# Patient Record
Sex: FEMALE | Race: WHITE | NOT HISPANIC OR LATINO | Employment: STUDENT | URBAN - METROPOLITAN AREA
[De-identification: names, ages, dates, MRNs, and addresses within clinical notes are randomized per-mention and may not be internally consistent; named-entity substitution may affect disease eponyms.]

---

## 2019-07-31 ENCOUNTER — HOSPITAL ENCOUNTER (OUTPATIENT)
Dept: LAB | Facility: CLINIC | Age: 19
Discharge: HOME | End: 2019-07-31
Attending: PEDIATRICS | Admitting: PEDIATRICS
Payer: COMMERCIAL

## 2019-07-31 DIAGNOSIS — Z11.1 TUBERCULOSIS SCREENING: Primary | ICD-10-CM

## 2019-07-31 DIAGNOSIS — Z11.1 SCREENING FOR TUBERCULOSIS: Primary | ICD-10-CM

## 2019-07-31 PROCEDURE — 36415 COLL VENOUS BLD VENIPUNCTURE: CPT | Performed by: PEDIATRICS

## 2019-07-31 PROCEDURE — 86481 TB AG RESPONSE T-CELL SUSP: CPT | Performed by: PEDIATRICS

## 2019-08-02 LAB
GAMMA INTERFERON BACKGROUND BLD IA-ACNC: 0.21 IU/ML
M TB IFN-G BLD-IMP: NEGATIVE
M TB IFN-G CD4+ BCKGRND COR BLD-ACNC: >10 IU/ML
MITOGEN IGNF BCKGRD COR BLD-ACNC: 0 IU/ML
MITOGEN IGNF BCKGRD COR BLD-ACNC: 0 IU/ML

## 2022-12-12 ENCOUNTER — LAB REQUISITION (OUTPATIENT)
Dept: LAB | Facility: CLINIC | Age: 22
End: 2022-12-12
Payer: COMMERCIAL

## 2022-12-12 DIAGNOSIS — D50.9 IRON DEFICIENCY ANEMIA, UNSPECIFIED: ICD-10-CM

## 2022-12-12 DIAGNOSIS — Z01.419 ENCOUNTER FOR GYNECOLOGICAL EXAMINATION (GENERAL) (ROUTINE) WITHOUT ABNORMAL FINDINGS: ICD-10-CM

## 2022-12-12 LAB
BASOPHILS # BLD AUTO: 0.1 10E3/UL (ref 0–0.2)
BASOPHILS NFR BLD AUTO: 1 %
EOSINOPHIL # BLD AUTO: 0.2 10E3/UL (ref 0–0.7)
EOSINOPHIL NFR BLD AUTO: 3 %
ERYTHROCYTE [DISTWIDTH] IN BLOOD BY AUTOMATED COUNT: 12.3 % (ref 10–15)
FERRITIN SERPL-MCNC: 36 NG/ML (ref 6–175)
HCT VFR BLD AUTO: 39.2 % (ref 35–47)
HGB BLD-MCNC: 13.4 G/DL (ref 11.7–15.7)
IMM GRANULOCYTES # BLD: 0 10E3/UL
IMM GRANULOCYTES NFR BLD: 1 %
LYMPHOCYTES # BLD AUTO: 2.5 10E3/UL (ref 0.8–5.3)
LYMPHOCYTES NFR BLD AUTO: 39 %
MCH RBC QN AUTO: 28.7 PG (ref 26.5–33)
MCHC RBC AUTO-ENTMCNC: 34.2 G/DL (ref 31.5–36.5)
MCV RBC AUTO: 84 FL (ref 78–100)
MONOCYTES # BLD AUTO: 0.4 10E3/UL (ref 0–1.3)
MONOCYTES NFR BLD AUTO: 6 %
NEUTROPHILS # BLD AUTO: 3.1 10E3/UL (ref 1.6–8.3)
NEUTROPHILS NFR BLD AUTO: 50 %
NRBC # BLD AUTO: 0 10E3/UL
NRBC BLD AUTO-RTO: 0 /100
PLATELET # BLD AUTO: 282 10E3/UL (ref 150–450)
RBC # BLD AUTO: 4.67 10E6/UL (ref 3.8–5.2)
WBC # BLD AUTO: 6.2 10E3/UL (ref 4–11)

## 2022-12-12 PROCEDURE — 82728 ASSAY OF FERRITIN: CPT | Mod: ORL | Performed by: PHYSICIAN ASSISTANT

## 2022-12-12 PROCEDURE — G0145 SCR C/V CYTO,THINLAYER,RESCR: HCPCS | Mod: ORL | Performed by: PHYSICIAN ASSISTANT

## 2022-12-12 PROCEDURE — 85025 COMPLETE CBC W/AUTO DIFF WBC: CPT | Mod: ORL | Performed by: PHYSICIAN ASSISTANT

## 2022-12-14 LAB
BKR LAB AP GYN ADEQUACY: NORMAL
BKR LAB AP GYN INTERPRETATION: NORMAL
BKR LAB AP HPV REFLEX: NORMAL
BKR LAB AP LMP: NORMAL
BKR LAB AP PREVIOUS ABNL DX: NORMAL
BKR LAB AP PREVIOUS ABNORMAL: NORMAL
PATH REPORT.COMMENTS IMP SPEC: NORMAL
PATH REPORT.COMMENTS IMP SPEC: NORMAL
PATH REPORT.RELEVANT HX SPEC: NORMAL

## 2023-04-19 ENCOUNTER — LAB (OUTPATIENT)
Dept: LAB | Facility: URGENT CARE | Age: 23
End: 2023-04-19

## 2023-04-19 PROCEDURE — 99000 SPECIMEN HANDLING OFFICE-LAB: CPT | Performed by: STUDENT IN AN ORGANIZED HEALTH CARE EDUCATION/TRAINING PROGRAM

## 2023-11-15 ENCOUNTER — OFFICE VISIT (OUTPATIENT)
Dept: FAMILY MEDICINE | Facility: CLINIC | Age: 23
End: 2023-11-15
Payer: COMMERCIAL

## 2023-11-15 ENCOUNTER — APPOINTMENT (OUTPATIENT)
Dept: CT IMAGING | Facility: HOSPITAL | Age: 23
End: 2023-11-15
Attending: EMERGENCY MEDICINE
Payer: COMMERCIAL

## 2023-11-15 ENCOUNTER — HOSPITAL ENCOUNTER (EMERGENCY)
Facility: HOSPITAL | Age: 23
Discharge: HOME | End: 2023-11-15
Admitting: EMERGENCY MEDICINE
Payer: COMMERCIAL

## 2023-11-15 VITALS
OXYGEN SATURATION: 99 % | TEMPERATURE: 100.1 F | DIASTOLIC BLOOD PRESSURE: 60 MMHG | HEART RATE: 88 BPM | WEIGHT: 145 LBS | HEIGHT: 67 IN | SYSTOLIC BLOOD PRESSURE: 110 MMHG | BODY MASS INDEX: 22.76 KG/M2 | RESPIRATION RATE: 16 BRPM

## 2023-11-15 VITALS
DIASTOLIC BLOOD PRESSURE: 62 MMHG | HEART RATE: 90 BPM | SYSTOLIC BLOOD PRESSURE: 102 MMHG | OXYGEN SATURATION: 98 % | TEMPERATURE: 98.5 F

## 2023-11-15 DIAGNOSIS — R55 SYNCOPE, UNSPECIFIED SYNCOPE TYPE: Primary | ICD-10-CM

## 2023-11-15 DIAGNOSIS — R11.2 NAUSEA AND VOMITING, UNSPECIFIED VOMITING TYPE: ICD-10-CM

## 2023-11-15 DIAGNOSIS — R14.0 BLOATING: ICD-10-CM

## 2023-11-15 DIAGNOSIS — R55 SYNCOPE: ICD-10-CM

## 2023-11-15 DIAGNOSIS — R11.2 NAUSEA AND VOMITING: ICD-10-CM

## 2023-11-15 LAB
ALBUMIN SERPL BCG-MCNC: 4.6 G/DL (ref 3.5–5.2)
ALBUMIN SERPL BCG-MCNC: 4.8 G/DL (ref 3.5–5.2)
ALBUMIN UR-MCNC: 20 MG/DL
ALP SERPL-CCNC: 48 U/L (ref 40–150)
ALP SERPL-CCNC: 50 U/L (ref 40–150)
ALT SERPL W P-5'-P-CCNC: 14 U/L (ref 0–50)
ALT SERPL W P-5'-P-CCNC: 16 U/L (ref 0–50)
ANION GAP SERPL CALCULATED.3IONS-SCNC: 11 MMOL/L (ref 7–15)
ANION GAP SERPL CALCULATED.3IONS-SCNC: 13 MMOL/L (ref 7–15)
APPEARANCE UR: CLEAR
AST SERPL W P-5'-P-CCNC: 16 U/L (ref 0–45)
AST SERPL W P-5'-P-CCNC: 22 U/L (ref 0–45)
ATRIAL RATE - MUSE: 67 BPM
BACTERIA #/AREA URNS HPF: ABNORMAL /HPF
BASOPHILS # BLD AUTO: 0 10E3/UL (ref 0–0.2)
BASOPHILS NFR BLD AUTO: 0 %
BILIRUB DIRECT SERPL-MCNC: <0.2 MG/DL (ref 0–0.3)
BILIRUB DIRECT SERPL-MCNC: <0.2 MG/DL (ref 0–0.3)
BILIRUB SERPL-MCNC: 0.6 MG/DL
BILIRUB SERPL-MCNC: 0.8 MG/DL
BILIRUB UR QL STRIP: NEGATIVE
BUN SERPL-MCNC: 18.7 MG/DL (ref 6–20)
BUN SERPL-MCNC: 23.3 MG/DL (ref 6–20)
CALCIUM SERPL-MCNC: 9.5 MG/DL (ref 8.6–10)
CALCIUM SERPL-MCNC: 9.6 MG/DL (ref 8.6–10)
CHLORIDE SERPL-SCNC: 103 MMOL/L (ref 98–107)
CHLORIDE SERPL-SCNC: 104 MMOL/L (ref 98–107)
COLOR UR AUTO: YELLOW
CREAT SERPL-MCNC: 0.74 MG/DL (ref 0.51–0.95)
CREAT SERPL-MCNC: 0.78 MG/DL (ref 0.51–0.95)
DEPRECATED HCO3 PLAS-SCNC: 24 MMOL/L (ref 22–29)
DEPRECATED HCO3 PLAS-SCNC: 24 MMOL/L (ref 22–29)
DIASTOLIC BLOOD PRESSURE - MUSE: NORMAL MMHG
EGFRCR SERPLBLD CKD-EPI 2021: >90 ML/MIN/1.73M2
EGFRCR SERPLBLD CKD-EPI 2021: >90 ML/MIN/1.73M2
EOSINOPHIL # BLD AUTO: 0.1 10E3/UL (ref 0–0.7)
EOSINOPHIL NFR BLD AUTO: 1 %
ERYTHROCYTE [DISTWIDTH] IN BLOOD BY AUTOMATED COUNT: 11.9 % (ref 10–15)
ERYTHROCYTE [DISTWIDTH] IN BLOOD BY AUTOMATED COUNT: 11.9 % (ref 10–15)
FLUAV RNA SPEC QL NAA+PROBE: NEGATIVE
FLUBV RNA RESP QL NAA+PROBE: NEGATIVE
GLUCOSE SERPL-MCNC: 122 MG/DL (ref 70–99)
GLUCOSE SERPL-MCNC: 99 MG/DL (ref 70–99)
GLUCOSE UR STRIP-MCNC: NEGATIVE MG/DL
HCG UR QL: NEGATIVE
HCT VFR BLD AUTO: 40.2 % (ref 35–47)
HCT VFR BLD AUTO: 41 % (ref 35–47)
HGB BLD-MCNC: 13.9 G/DL (ref 11.7–15.7)
HGB BLD-MCNC: 14.8 G/DL (ref 11.7–15.7)
HGB UR QL STRIP: NEGATIVE
IMM GRANULOCYTES # BLD: 0 10E3/UL
IMM GRANULOCYTES NFR BLD: 0 %
INTERPRETATION ECG - MUSE: NORMAL
KETONES UR STRIP-MCNC: 10 MG/DL
LEUKOCYTE ESTERASE UR QL STRIP: ABNORMAL
LIPASE SERPL-CCNC: 16 U/L (ref 13–60)
LYMPHOCYTES # BLD AUTO: 1 10E3/UL (ref 0.8–5.3)
LYMPHOCYTES NFR BLD AUTO: 10 %
MCH RBC QN AUTO: 28.8 PG (ref 26.5–33)
MCH RBC QN AUTO: 29.7 PG (ref 26.5–33)
MCHC RBC AUTO-ENTMCNC: 34.6 G/DL (ref 31.5–36.5)
MCHC RBC AUTO-ENTMCNC: 36.1 G/DL (ref 31.5–36.5)
MCV RBC AUTO: 82 FL (ref 78–100)
MCV RBC AUTO: 83 FL (ref 78–100)
MONOCYTES # BLD AUTO: 0.5 10E3/UL (ref 0–1.3)
MONOCYTES NFR BLD AUTO: 5 %
MUCOUS THREADS #/AREA URNS LPF: PRESENT /LPF
NEUTROPHILS # BLD AUTO: 9 10E3/UL (ref 1.6–8.3)
NEUTROPHILS NFR BLD AUTO: 84 %
NITRATE UR QL: NEGATIVE
P AXIS - MUSE: 6 DEGREES
PH UR STRIP: 5.5 [PH] (ref 5–7)
PLATELET # BLD AUTO: 270 10E3/UL (ref 150–450)
PLATELET # BLD AUTO: 324 10E3/UL (ref 150–450)
POTASSIUM SERPL-SCNC: 3.7 MMOL/L (ref 3.4–5.3)
POTASSIUM SERPL-SCNC: 3.9 MMOL/L (ref 3.4–5.3)
PR INTERVAL - MUSE: 134 MS
PROT SERPL-MCNC: 7.6 G/DL (ref 6.4–8.3)
PROT SERPL-MCNC: 8 G/DL (ref 6.4–8.3)
QRS DURATION - MUSE: 100 MS
QT - MUSE: 426 MS
QTC - MUSE: 450 MS
R AXIS - MUSE: 47 DEGREES
RBC # BLD AUTO: 4.83 10E6/UL (ref 3.8–5.2)
RBC # BLD AUTO: 4.99 10E6/UL (ref 3.8–5.2)
RBC URINE: <1 /HPF
RSV RNA SPEC NAA+PROBE: NEGATIVE
SARS-COV-2 RNA RESP QL NAA+PROBE: NEGATIVE
SODIUM SERPL-SCNC: 139 MMOL/L (ref 135–145)
SODIUM SERPL-SCNC: 140 MMOL/L (ref 135–145)
SP GR UR STRIP: 1.03 (ref 1–1.03)
SQUAMOUS EPITHELIAL: 2 /HPF
SYSTOLIC BLOOD PRESSURE - MUSE: NORMAL MMHG
T AXIS - MUSE: 33 DEGREES
TSH SERPL DL<=0.005 MIU/L-ACNC: 1.65 UIU/ML (ref 0.3–4.2)
UROBILINOGEN UR STRIP-MCNC: <2 MG/DL
VENTRICULAR RATE- MUSE: 67 BPM
WBC # BLD AUTO: 10.6 10E3/UL (ref 4–11)
WBC # BLD AUTO: 10.7 10E3/UL (ref 4–11)
WBC URINE: 3 /HPF

## 2023-11-15 PROCEDURE — 36415 COLL VENOUS BLD VENIPUNCTURE: CPT | Performed by: EMERGENCY MEDICINE

## 2023-11-15 PROCEDURE — 84075 ASSAY ALKALINE PHOSPHATASE: CPT | Performed by: FAMILY MEDICINE

## 2023-11-15 PROCEDURE — 84450 TRANSFERASE (AST) (SGOT): CPT | Performed by: FAMILY MEDICINE

## 2023-11-15 PROCEDURE — 93005 ELECTROCARDIOGRAM TRACING: CPT | Performed by: FAMILY MEDICINE

## 2023-11-15 PROCEDURE — 250N000011 HC RX IP 250 OP 636: Mod: JZ | Performed by: EMERGENCY MEDICINE

## 2023-11-15 PROCEDURE — 36415 COLL VENOUS BLD VENIPUNCTURE: CPT | Performed by: FAMILY MEDICINE

## 2023-11-15 PROCEDURE — 99285 EMERGENCY DEPT VISIT HI MDM: CPT | Mod: 25

## 2023-11-15 PROCEDURE — 84155 ASSAY OF PROTEIN SERUM: CPT | Performed by: FAMILY MEDICINE

## 2023-11-15 PROCEDURE — 83690 ASSAY OF LIPASE: CPT | Performed by: EMERGENCY MEDICINE

## 2023-11-15 PROCEDURE — 84443 ASSAY THYROID STIM HORMONE: CPT | Performed by: FAMILY MEDICINE

## 2023-11-15 PROCEDURE — 93005 ELECTROCARDIOGRAM TRACING: CPT | Performed by: EMERGENCY MEDICINE

## 2023-11-15 PROCEDURE — 82248 BILIRUBIN DIRECT: CPT | Performed by: FAMILY MEDICINE

## 2023-11-15 PROCEDURE — 82248 BILIRUBIN DIRECT: CPT | Performed by: EMERGENCY MEDICINE

## 2023-11-15 PROCEDURE — 81001 URINALYSIS AUTO W/SCOPE: CPT | Performed by: EMERGENCY MEDICINE

## 2023-11-15 PROCEDURE — 258N000003 HC RX IP 258 OP 636: Performed by: EMERGENCY MEDICINE

## 2023-11-15 PROCEDURE — 81025 URINE PREGNANCY TEST: CPT | Performed by: EMERGENCY MEDICINE

## 2023-11-15 PROCEDURE — 70486 CT MAXILLOFACIAL W/O DYE: CPT

## 2023-11-15 PROCEDURE — 84460 ALANINE AMINO (ALT) (SGPT): CPT | Performed by: FAMILY MEDICINE

## 2023-11-15 PROCEDURE — 85027 COMPLETE CBC AUTOMATED: CPT | Performed by: EMERGENCY MEDICINE

## 2023-11-15 PROCEDURE — 87637 SARSCOV2&INF A&B&RSV AMP PRB: CPT | Performed by: EMERGENCY MEDICINE

## 2023-11-15 PROCEDURE — 96374 THER/PROPH/DIAG INJ IV PUSH: CPT

## 2023-11-15 PROCEDURE — 99204 OFFICE O/P NEW MOD 45 MIN: CPT | Mod: 25 | Performed by: FAMILY MEDICINE

## 2023-11-15 PROCEDURE — 85025 COMPLETE CBC W/AUTO DIFF WBC: CPT | Performed by: FAMILY MEDICINE

## 2023-11-15 PROCEDURE — 96361 HYDRATE IV INFUSION ADD-ON: CPT

## 2023-11-15 PROCEDURE — 82247 BILIRUBIN TOTAL: CPT | Performed by: FAMILY MEDICINE

## 2023-11-15 PROCEDURE — 70450 CT HEAD/BRAIN W/O DYE: CPT

## 2023-11-15 RX ORDER — ONDANSETRON 4 MG/1
4 TABLET, ORALLY DISINTEGRATING ORAL EVERY 6 HOURS PRN
Qty: 10 TABLET | Refills: 0 | Status: SHIPPED | OUTPATIENT
Start: 2023-11-15 | End: 2023-11-18

## 2023-11-15 RX ORDER — ONDANSETRON 2 MG/ML
4 INJECTION INTRAMUSCULAR; INTRAVENOUS ONCE
Status: DISCONTINUED | OUTPATIENT
Start: 2023-11-15 | End: 2023-11-15

## 2023-11-15 RX ORDER — ONDANSETRON HYDROCHLORIDE 4 MG/5ML
4 SOLUTION ORAL ONCE
Status: DISCONTINUED | OUTPATIENT
Start: 2023-11-15 | End: 2023-11-15

## 2023-11-15 RX ORDER — ONDANSETRON 2 MG/ML
4 INJECTION INTRAMUSCULAR; INTRAVENOUS ONCE
Status: COMPLETED | OUTPATIENT
Start: 2023-11-15 | End: 2023-11-15

## 2023-11-15 RX ADMIN — ONDANSETRON 4 MG: 2 INJECTION INTRAMUSCULAR; INTRAVENOUS at 19:04

## 2023-11-15 RX ADMIN — SODIUM CHLORIDE 1000 ML: 9 INJECTION, SOLUTION INTRAVENOUS at 19:04

## 2023-11-15 ASSESSMENT — ACTIVITIES OF DAILY LIVING (ADL)
ADLS_ACUITY_SCORE: 35
ADLS_ACUITY_SCORE: 35

## 2023-11-15 NOTE — PATIENT INSTRUCTIONS
-Thank you for choosing the [facility].  -It was a pleasure to see you today.  -Please take a look at the information below for more specific details regarding the treatment plan and recommendations.  -In this after visit summary is a list of your medications and specific instructions.  Please review this carefully as there may be changes made to your medication list.  -If there are any particular questions or concerns, please feel free to reach out to Dr. Buckner.  -If any labs have been completed, we will reach out to you about results.  If the results are normal or not concerning, a letter or Aurelianthart message will be sent to you.  If any follow-up is needed, either Dr. Buckner or the nurse will give you a call.  If you have not heard regarding results after 2 weeks, please reach out to the clinic.    Patient Instructions:    -As you continue to have near syncopal episodes with nausea and vomiting, Dr. Buckner recommends he go to the emergency room immediately.  -It is recommended for you to have further testing and treatment.  -The primary recommendation is for you to go by ambulance, though you have chosen to go by car.  If any significant worsening conditions are noted, please call 911.        --------------------------------------------------------------------------------------------------------------------    -We are always looking for ways to improve.  You may be selected to receive a survey regarding your visit today.  We encourage you to complete the survey and provide specific, constructive feedback to help us improve our processes.  Thank you for your time!  -Please review the contact information listed on the after visit summary and in the electronic chart.  Below is the phone number that we have on file.  If there are any changes that are needed to be made, please reach out to the clinic.  [phone] (home)

## 2023-11-15 NOTE — PROGRESS NOTES
OFFICE VISIT    Assessment/Plan:     Patient Instructions:    -As you continue to have near syncopal episodes with nausea and vomiting, Dr. Buckner recommends he go to the emergency room immediately.  -It is recommended for you to have further testing and treatment.  -The primary recommendation is for you to go by ambulance, though you have chosen to go by car.  If any significant worsening conditions are noted, please call 911.      Diagnoses and all orders for this visit:    Syncope, unspecified syncope type  Nausea vomiting, unspecified vomiting type  Bloating: Concerning that this may be related to patient's lunch.  No history to suggest cardiac etiology.  Exam was benign.  Attempted trials with oral intake, which patient was able to drink, though would have recurrent near syncopal episodes when doing walking trials after about 30 feet.  EKG demonstrated normal sinus rhythm.  No concerning ST or T wave changes or any arrhythmias present.  CBC also normal as well.  Other labs are in process.  Clinic is unable to complete IV fluids.  Patient had declined IM Zofran.  No oral Zofran available.  As patient seems to be unstable to go home, provider recommended that patient go to the emergency room for further evaluation and treatment for her ongoing symptoms.  Provider recommended patient go by ambulance.  After discussion, patient elects to go by car (her roommate will drive her).  Patient does have capacity to make this decision at the time of visit.  Also present was her roommate (in person) and mother (on the phone).   -     EKG 12-lead, tracing only  -     Basic metabolic panel; Future  -     CBC with Platelets & Differential; Future  -     Hepatic function panel; Future  -     TSH with free T4 reflex; Future        Return in about 1 week (around 11/22/2023) for Follow up with PCP.    The diagnoses, treatment options, risk, benefits, and recommendations were reviewed with patient/guardian.  Questions were answered  to patient's/guardian satisfaction.  Red flag signs were reviewed.  Patient/guardian is in agreement with above plan.      Subjective: 23 year old female who is generally healthy who presents to clinic for the following complaints:   No chief complaint on file.    Patient reports that earlier today after lunch when she was in clinic, she was with her preceptor and they were seeing a patient.  Patient was sitting down and listening.  She then started to feel nauseous and started to see stars.  She then started to see some lights and reminded herself that she would talk with the preceptor after the visit.  She told herself not get up, though thinks she may have and then she blacked out.  She could not remember getting up, though when she woke up, her preceptor was asking if she was okay.  She knew where she was when she woke up.  She thinks she had must of fallen and hit herself (head) on something).  She has a small scrape on the bridge of the nose and feels a bruise starting to form on the left upper lip region.  The teeth do not feel particularly painful.  The nose shape itself is okay.    She continues to feel nauseous.  She reports that prior to the above, she had eaten chili for lunch, which she had made the chili over the weekend.  She remembers feeling bloated after finishing the food, so she took some Tums, though that did not seem to help much.  Denies any recent fevers, chills, coughing, feeling ill.  Patient vomited 3 times during the visit with this provider.  Vomit was orange in color without any obvious blood or black discolorations.  Denies any burning with urination, urinary frequency.  She is certain that she is not pregnant.  Patient usually drinks 2 of the large amounts of water containers each day.  Denies any personal history of health issues.  At baseline, she takes a multivitamin once a day.  No other medications.  Denies any recent travels, prolonged immobilization, shortness of breath, chest  pain, or other changes.    On recheck of patient 4:30 PM, she reported feeling better.  Did a trial walking and after about 20 feet, she started feeling unwell.  She sat down and then after a bit, she started having tunnel vision and felt nauseous again and vomited.  Recommended for patient to take Zofran, though she declined.    On recheck of the patient the 5:25 PM, patient continues to have near syncopal episodes when walking.  No more vomiting noted, however.  Patient is feeling unsteady.    The 10 point review of system is negative except as stated in the HPI.    Allergies were reviewed and updated.    Objective:   /62 (BP Location: Left arm, Patient Position: Sitting, Cuff Size: Adult Regular)   Pulse 90   Temp 98.5  F (36.9  C)   SpO2 98%   General: Active, alert. Episodes of pale appearance.   HEENT: Normocephalic, atraumatic.  Pupils are equal and round.  Sclera is clear.  Ears: Normal external ear.  Normal, patent ear canal. Tympanic membrane: pearly, gray, transparent. Middle ear fluid is not noted.  Nasal discharge is not noted. Oropharynx: moist mucous membranes. Erythema of the posterior oropharynx is not present. Tonsils do not have erythema/exudates. No masses, fluctuance, crepitus is noted of the neck.  Cervical lymphadenopathy is not noted to palpation.   Cardiac: RRR.  S1, S2 present.  No murmurs, rubs, or gallops.  Respiratory/chest: Clear to auscultation bilaterally.  No wheezes, rales, rhonchi.  Breathing is not labored.  No accessory muscle usage.  Abdomen: Soft, nondistended, nontender.  No masses or organomegaly noted.  No guarding or rebound tenderness appreciated.  Extremities: Voluntary movements intact.  Integumentary: No concerning rash or skin changes appreciated.    Amount of time spent in chart review, direct patient contact, care coordination, and related activities to patient care on the day of appointment: 45 minutes. (Patient was in clinic for a total of two hours for  the visit)      Nigel Buckner MD  [facility] [place] [address]  Phone: [phone]  Fax: [phone]    11/15/2023  5:58 PM            No current outpatient medications on file.     No current facility-administered medications for this visit.       Allergies   Allergen Reactions    Shrimp        There are no problems to display for this patient.      No family history on file.    No past surgical history on file.     Social History     Socioeconomic History    Marital status: Single     Spouse name: Not on file    Number of children: Not on file    Years of education: Not on file    Highest education level: Not on file   Occupational History    Not on file   Tobacco Use    Smoking status: Not on file    Smokeless tobacco: Not on file   Substance and Sexual Activity    Alcohol use: Not on file    Drug use: Not on file    Sexual activity: Not on file   Other Topics Concern    Not on file   Social History Narrative    Not on file     Social Determinants of Health     Financial Resource Strain: Not on file   Food Insecurity: Not on file   Transportation Needs: Not on file   Physical Activity: Not on file   Stress: Not on file   Social Connections: Not on file   Interpersonal Safety: Not on file   Housing Stability: Not on file

## 2023-11-16 LAB
ATRIAL RATE - MUSE: 87 BPM
DIASTOLIC BLOOD PRESSURE - MUSE: NORMAL MMHG
INTERPRETATION ECG - MUSE: NORMAL
P AXIS - MUSE: -18 DEGREES
PR INTERVAL - MUSE: 122 MS
QRS DURATION - MUSE: 92 MS
QT - MUSE: 370 MS
QTC - MUSE: 445 MS
R AXIS - MUSE: 15 DEGREES
SYSTOLIC BLOOD PRESSURE - MUSE: NORMAL MMHG
T AXIS - MUSE: -15 DEGREES
VENTRICULAR RATE- MUSE: 87 BPM

## 2023-11-16 NOTE — ED PROVIDER NOTES
EMERGENCY DEPARTMENT ENCOUNTER      NAME: Renita Orantes  AGE: 23 year old female  YOB: 2000  MRN: [id number]  EVALUATION DATE & TIME: 11/15/2023  6:04 PM    PCP: Ammy Jacome    ED PROVIDER: Puja Navarro PA-C    Chief Complaint   Patient presents with    Syncope       FINAL IMPRESSION:  1. Syncope    2. Nausea and vomiting        MEDICAL DECISION MAKING:    Pertinent Labs & Imaging studies reviewed. (See chart for details)  23 year old female presents to the Emergency Department for evaluation after syncopal episode.  The patient started to have some abdominal bloating and acid reflux after eating lunch this afternoon.  She was in a patient room with her preceptor when she started to feel extremely nauseated and like she was going to vomit.  She excused herself as she felt that she again was going to vomit and after leaving the room ended up having a syncopal episode.  She was able to get herself up after the syncopal episode and went to a patient room that was empty and laid on the exam table.  One of the primary care provider is who works there came and assessed her and she ended up having an episode of vomiting at that time.  She was feeling somewhat improved however, when she tried to get up she felt lightheaded again, like she was going to pass out and had another episode of vomiting.  They were concerned about her symptoms and recommended she come to the emergency department.  On my evaluation, the patient had a temperature of 100.1  F was slightly tachycardic at 103 but was otherwise vitally stable.  Patient appeared generally ill and pale.  Heart was in regular rate and rhythm and lungs were clear to auscultation bilaterally.  Abdomen soft, nontender without any rebound or guarding.  GCS 15.  Dentition normal.  Tenderness palpation of the nasal bones without any crepitus or obvious deformity.  Bilateral TMs normal.  Pupils equal round and reactive to light and extraocular  movements intact.  5 out of 5 strength and sensation in bilateral upper and lower extremities.  Differential diagnosis included viral gastroenteritis, COVID, influenza, RSV, UTI, pregnancy, facial bone fracture, intracranial bleed, vasovagal syncope.    UA without any signs of infection.  Urine pregnancy negative.  CBC without any derangement.  BMP without any significant derangement.  Lipase normal at 16.  LFTs without derangement.  COVID, influenza, RSV testing negative.  At this time, her symptoms are likely related to vasovagal symptom Capizzi with likely relation to a viral gastroenteritis.  Patient feeling improved after fluids and nausea medications given here.  With the fall and episodes of vomiting I did feel CT imaging of the head and facial bones were indicated at this time.  These were independently interpreted by myself and did not show any obvious fracture or intracranial bleed.  Formal read with no acute findings.  Patient feels reassured by evaluation and feels comfortable with discharge home with close follow-up with primary care.  We will discharge home with Zofran prescription.  We discussed signs and symptoms to return to the emergency department and all questions were answered best my ability.  She was discharged in stable condition.    Medical Decision Making    History:  Supplemental history from: Documented in chart, if applicable  External Record(s) reviewed: Documented in chart, if applicable.    Work Up:  Chart documentation includes differential considered and any EKGs or imaging independently interpreted by provider, where specified.  In additional to work up documented, I considered the following work up: Documented in chart, if applicable.    External consultation:  Discussion of management with another provider: Documented in chart, if applicable    Complicating factors:  Care impacted by chronic illness: N/A  Care affected by social determinants of health: Access to Medical  Care    Disposition considerations: Discharge. I prescribed additional prescription strength medication(s) as charted. See documentation for any additional details.        ED COURSE:  6:30 PM I met with the patient, obtained history, performed an initial exam, and discussed options and plan for diagnostics and treatment here in the ED.    8:36 PM Patient discharged after being provided with extensive anticipatory guidance and given return precautions, importance of PCP follow-up emphasized.    At the conclusion of the encounter I discussed the results of all of the tests and the disposition. The questions were answered. The patient or family acknowledged understanding and was agreeable with the care plan.     MEDICATIONS GIVEN IN THE EMERGENCY:  Medications   ondansetron (ZOFRAN) injection 4 mg (4 mg Intravenous $Given 11/15/23 1904)   sodium chloride 0.9% BOLUS 1,000 mL (1,000 mLs Intravenous $New Bag 11/15/23 1904)       NEW PRESCRIPTIONS STARTED AT TODAY'S ER VISIT  New Prescriptions    ONDANSETRON (ZOFRAN ODT) 4 MG ODT TAB    Take 1 tablet (4 mg) by mouth every 6 hours as needed for nausea or vomiting     =================================================================    HPI:    Patient information was obtained from: The patient    Use of Interpretor: N/A    Renita Orantes is a 23 year old female who presents to this ED via private vehicle for evaluation after a syncopal episode.  After eating lunch this afternoon the patient started to feel some abdominal bloating and acid reflux which she did not feel too concerned about however, as the afternoon went on she started develop significant nausea and felt like she was going to vomit.  As she was trying to get out of the patient room she ended up having a syncopal episode and hit her face on the ground.  She was able to get herself up off the ground and into an exam room and when one of the primary care providers at the clinic came in she ended up having  "an episode of vomiting.  She was resting on the exam table and attempted to get up when she felt lightheaded, presyncopal and again had another episode of vomiting.  They were concerned about her symptoms and recommended she come to the emergency department.  On my evaluation, the patient reports that she feels somewhat nauseated and chilled at this time with some pain in her nose but no significant headache.  She denies any vision changes or focal weakness.  She remembers everything leading up to and after the syncopal event.  She reports that she was otherwise feeling well prior to this but now just does feel generally unwell with some abdominal bloating, nausea and chills.  She denies any chest pain or difficulty breathing prior to the incident.  She has been otherwise able to eat and drink well.  No other concerns voiced at this time.      REVIEW OF SYSTEMS:  Negative unless otherwise stated in the above HPI.    PAST MEDICAL HISTORY:  No past medical history on file.    PAST SURGICAL HISTORY:  No past surgical history on file.    CURRENT MEDICATIONS:    No current facility-administered medications for this encounter.    Current Outpatient Medications:     ondansetron (ZOFRAN ODT) 4 MG ODT tab, Take 1 tablet (4 mg) by mouth every 6 hours as needed for nausea or vomiting, Disp: 10 tablet, Rfl: 0    ALLERGIES:  Allergies   Allergen Reactions    Shrimp        FAMILY HISTORY:  No family history on file.    SOCIAL HISTORY:   Social History     Socioeconomic History    Marital status: Single       VITALS:  Patient Vitals for the past 24 hrs:   BP Temp Temp src Pulse Resp SpO2 Height Weight   11/15/23 1803 117/61 -- -- 103 -- 98 % -- --   11/15/23 1802 -- 100.1  F (37.8  C) Temporal -- 16 -- -- --   11/15/23 1801 -- -- -- -- -- -- 1.702 m (5' 7\") 65.8 kg (145 lb)       PHYSICAL EXAM    Constitutional: Well developed, Well nourished, NAD  HENT: Normocephalic, Atraumatic, Bilateral external ears normal, Oropharynx normal, " mucous membranes moist, Nose normal.  Mild tenderness to palpation of the nasal bones, no tenderness to palpation of other facial bones.  No dental trauma.  Neck: Normal range of motion, No tenderness, Supple, No stridor.  Eyes: PERRL, EOMI, Conjunctiva normal, No discharge.   Respiratory: Normal breath sounds, No respiratory distress, No wheezing, Speaks full sentences easily. No cough.  Cardiovascular: Normal heart rate, Regular rhythm, No murmurs, No rubs, No gallops. Chest wall nontender.  Musculoskeletal: 2+ DP pulses. No edema. No cyanosis, No clubbing. Good range of motion in all major joints. No tenderness to palpation or major deformities noted. No tenderness of the CTLS spine.   Integument: Warm, Dry, No erythema, No rash. No petechiae.  Neurologic: GCS 15.  Alert & oriented x 3, Normal motor function, Normal sensory function, No focal deficits noted. Normal gait.  Psychiatric: Affect normal, Judgment normal, Mood normal. Cooperative.    LAB:  All pertinent labs reviewed and interpreted.  Recent Results (from the past 24 hour(s))   CBC with platelets and differential    Collection Time: 11/15/23  4:01 PM   Result Value Ref Range    WBC Count 10.7 4.0 - 11.0 10e3/uL    RBC Count 4.99 3.80 - 5.20 10e6/uL    Hemoglobin 14.8 11.7 - 15.7 g/dL    Hematocrit 41.0 35.0 - 47.0 %    MCV 82 78 - 100 fL    MCH 29.7 26.5 - 33.0 pg    MCHC 36.1 31.5 - 36.5 g/dL    RDW 11.9 10.0 - 15.0 %    Platelet Count 324 150 - 450 10e3/uL    % Neutrophils 84 %    % Lymphocytes 10 %    % Monocytes 5 %    % Eosinophils 1 %    % Basophils 0 %    % Immature Granulocytes 0 %    Absolute Neutrophils 9.0 (H) 1.6 - 8.3 10e3/uL    Absolute Lymphocytes 1.0 0.8 - 5.3 10e3/uL    Absolute Monocytes 0.5 0.0 - 1.3 10e3/uL    Absolute Eosinophils 0.1 0.0 - 0.7 10e3/uL    Absolute Basophils 0.0 0.0 - 0.2 10e3/uL    Absolute Immature Granulocytes 0.0 <=0.4 10e3/uL   EKG 12-lead, tracing only    Collection Time: 11/15/23  4:58 PM   Result Value Ref  Range    Systolic Blood Pressure  mmHg    Diastolic Blood Pressure  mmHg    Ventricular Rate 67 BPM    Atrial Rate 67 BPM    GA Interval 134 ms    QRS Duration 100 ms     ms    QTc 450 ms    P Axis 6 degrees    R AXIS 47 degrees    T Axis 33 degrees    Interpretation ECG       Sinus rhythm  Normal ECG  No previous ECGs available     UA with Microscopic reflex to Culture    Collection Time: 11/15/23  6:56 PM    Specimen: Urine, Clean Catch   Result Value Ref Range    Color Urine Yellow Colorless, Straw, Light Yellow, Yellow    Appearance Urine Clear Clear    Glucose Urine Negative Negative mg/dL    Bilirubin Urine Negative Negative    Ketones Urine 10 (A) Negative mg/dL    Specific Gravity Urine 1.029 1.001 - 1.030    Blood Urine Negative Negative    pH Urine 5.5 5.0 - 7.0    Protein Albumin Urine 20 (A) Negative mg/dL    Urobilinogen Urine <2.0 <2.0 mg/dL    Nitrite Urine Negative Negative    Leukocyte Esterase Urine 75 Bari/uL (A) Negative    Bacteria Urine Few (A) None Seen /HPF    Mucus Urine Present (A) None Seen /LPF    RBC Urine <1 <=2 /HPF    WBC Urine 3 <=5 /HPF    Squamous Epithelials Urine 2 (H) <=1 /HPF   HCG qualitative urine    Collection Time: 11/15/23  6:56 PM   Result Value Ref Range    hCG Urine Qualitative Negative Negative   Symptomatic Influenza A/B, RSV, & SARS-CoV2 PCR (COVID-19) Nasopharyngeal    Collection Time: 11/15/23  6:57 PM    Specimen: Nasopharyngeal; Swab   Result Value Ref Range    Influenza A PCR Negative Negative    Influenza B PCR Negative Negative    RSV PCR Negative Negative    SARS CoV2 PCR Negative Negative   CBC (+ platelets, no diff)    Collection Time: 11/15/23  7:03 PM   Result Value Ref Range    WBC Count 10.6 4.0 - 11.0 10e3/uL    RBC Count 4.83 3.80 - 5.20 10e6/uL    Hemoglobin 13.9 11.7 - 15.7 g/dL    Hematocrit 40.2 35.0 - 47.0 %    MCV 83 78 - 100 fL    MCH 28.8 26.5 - 33.0 pg    MCHC 34.6 31.5 - 36.5 g/dL    RDW 11.9 10.0 - 15.0 %    Platelet Count 270 150 -  450 10e3/uL   Basic metabolic panel    Collection Time: 11/15/23  7:03 PM   Result Value Ref Range    Sodium 139 135 - 145 mmol/L    Potassium 3.9 3.4 - 5.3 mmol/L    Chloride 104 98 - 107 mmol/L    Carbon Dioxide (CO2) 24 22 - 29 mmol/L    Anion Gap 11 7 - 15 mmol/L    Urea Nitrogen 23.3 (H) 6.0 - 20.0 mg/dL    Creatinine 0.74 0.51 - 0.95 mg/dL    GFR Estimate >90 >60 mL/min/1.73m2    Calcium 9.6 8.6 - 10.0 mg/dL    Glucose 122 (H) 70 - 99 mg/dL   Lipase    Collection Time: 11/15/23  7:03 PM   Result Value Ref Range    Lipase 16 13 - 60 U/L   Hepatic function panel    Collection Time: 11/15/23  7:03 PM   Result Value Ref Range    Protein Total 7.6 6.4 - 8.3 g/dL    Albumin 4.6 3.5 - 5.2 g/dL    Bilirubin Total 0.8 <=1.2 mg/dL    Alkaline Phosphatase 48 40 - 150 U/L    AST 16 0 - 45 U/L    ALT 14 0 - 50 U/L    Bilirubin Direct <0.20 0.00 - 0.30 mg/dL         RADIOLOGY:  Reviewed all pertinent imaging. Please see official radiology report.  CT Facial Bones without Contrast   Final Result   IMPRESSION:   HEAD CT:   1.  Normal head CT.      FACIAL BONE CT:   1.  No facial bone or mandibular fracture.   2.  Left maxillary retention cyst. No air-fluid level.         Head CT w/o contrast   Final Result   IMPRESSION:   HEAD CT:   1.  Normal head CT.      FACIAL BONE CT:   1.  No facial bone or mandibular fracture.   2.  Left maxillary retention cyst. No air-fluid level.             EKG:  Performed at: 19:13    Impression: Normal sinus rhythm. Nonspecific T wave abnormality  Rate: 87  Rhythm: sinus  MD Interval: 122  QRS Interval: 92  QTc Interval: 445  Axis: -18, 15, -15    Comparison: Compared with EKG from 11/15/23 at 16:58, inverted T waves have replaced nonspecific T wave abnormality in inferior leads.    I have independently reviewed and interpreted the EKG(s) documented above.      PROCEDURES:   None      I, Alexandro Arana, am serving as a scribe to document services personally performed by Puja Navarro PA-C based  on my observation and the provider's statements to me. I, Puja Navarro PA-C attest that Alexandro Arana is acting in a scribe capacity, has observed my performance of the services and has documented them in accordance with my direction.    Puja Navarro PA-C  Emergency Medicine  [facility]  11/15/2023       Puja Navarro PA-C  11/15/23 2037

## 2023-11-16 NOTE — ED TRIAGE NOTES
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 Has a syncopal episode after eating lunch and then vomited.   MD checked her out and wanted her to be seen     Triage Assessment (Adult)       Row Name 11/15/23 1069          Triage Assessment    Airway WDL WDL        Respiratory WDL    Respiratory WDL WDL        Skin Circulation/Temperature WDL    Skin Circulation/Temperature WDL WDL        Cardiac WDL    Cardiac WDL WDL        Peripheral/Neurovascular WDL    Peripheral Neurovascular WDL WDL        Cognitive/Neuro/Behavioral WDL    Cognitive/Neuro/Behavioral WDL WDL

## 2023-11-16 NOTE — DISCHARGE INSTRUCTIONS
You are seen and evaluated here in the emergency department for your nausea and syncopal episode.  Fortunately, laboratory evaluation and CT scans are without any acute findings.  At this time, I do feel you likely have a viral gastroenteritis and the syncope was caused by a vasovagal reaction due to the nausea and wanting to vomit.  At this time, I recommend Tylenol, ibuprofen, plenty of fluids and rest.  I will discharge you home with Zofran to help with symptoms.    If you develop severe worsening symptoms, you pass out again, have chest pain, shortness of breath, significant sudden onset of headache, vision changes, focal weakness or any other concerns return to the emergency department.    Otherwise, I recommend close follow-up with primary care if symptoms are not improving.

## 2024-07-09 ENCOUNTER — TELEPHONE (OUTPATIENT)
Dept: FAMILY MEDICINE | Facility: CLINIC | Age: 24
End: 2024-07-09
Payer: COMMERCIAL

## 2024-07-09 DIAGNOSIS — L03.818 CELLULITIS OF OTHER SPECIFIED SITE: Primary | ICD-10-CM

## 2024-07-09 RX ORDER — CEPHALEXIN 500 MG/1
500 CAPSULE ORAL 2 TIMES DAILY
Qty: 14 CAPSULE | Refills: 0 | Status: SHIPPED | OUTPATIENT
Start: 2024-07-09 | End: 2024-07-16

## 2024-07-09 NOTE — CONFIDENTIAL NOTE
Patient seen in clinic with a central ulceration likely started as a pustule, now erythematous and draining with a 6cm diameter well demarcated erythematous patch. Not pruritc.     Treat empirically for cellulitis     Mookie Clement MD

## 2024-12-17 ENCOUNTER — LAB REQUISITION (OUTPATIENT)
Dept: LAB | Facility: CLINIC | Age: 24
End: 2024-12-17
Payer: COMMERCIAL

## 2024-12-17 DIAGNOSIS — Z01.419 ENCOUNTER FOR GYNECOLOGICAL EXAMINATION (GENERAL) (ROUTINE) WITHOUT ABNORMAL FINDINGS: ICD-10-CM

## 2024-12-17 LAB
CHOLEST SERPL-MCNC: 171 MG/DL
EST. AVERAGE GLUCOSE BLD GHB EST-MCNC: 100 MG/DL
FASTING STATUS PATIENT QL REPORTED: YES
HBA1C MFR BLD: 5.1 %
HDLC SERPL-MCNC: 57 MG/DL
LDLC SERPL CALC-MCNC: 101 MG/DL
NONHDLC SERPL-MCNC: 114 MG/DL
TRIGL SERPL-MCNC: 65 MG/DL